# Patient Record
Sex: FEMALE | Race: WHITE | HISPANIC OR LATINO | Employment: FULL TIME | URBAN - METROPOLITAN AREA
[De-identification: names, ages, dates, MRNs, and addresses within clinical notes are randomized per-mention and may not be internally consistent; named-entity substitution may affect disease eponyms.]

---

## 2023-12-21 ENCOUNTER — HOSPITAL ENCOUNTER (EMERGENCY)
Facility: CLINIC | Age: 28
Discharge: HOME | End: 2023-12-21
Attending: EMERGENCY MEDICINE | Admitting: EMERGENCY MEDICINE

## 2023-12-21 VITALS
OXYGEN SATURATION: 99 % | HEART RATE: 67 BPM | WEIGHT: 125 LBS | DIASTOLIC BLOOD PRESSURE: 76 MMHG | TEMPERATURE: 98.3 F | SYSTOLIC BLOOD PRESSURE: 114 MMHG | RESPIRATION RATE: 16 BRPM

## 2023-12-21 DIAGNOSIS — L03.114 CELLULITIS OF LEFT UPPER EXTREMITY: ICD-10-CM

## 2023-12-21 PROCEDURE — 99284 EMERGENCY DEPT VISIT MOD MDM: CPT

## 2023-12-21 PROCEDURE — 250N000013 HC RX MED GY IP 250 OP 250 PS 637: Performed by: EMERGENCY MEDICINE

## 2023-12-21 RX ORDER — CEPHALEXIN 500 MG/1
500 CAPSULE ORAL ONCE
Status: COMPLETED | OUTPATIENT
Start: 2023-12-21 | End: 2023-12-21

## 2023-12-21 RX ORDER — CEPHALEXIN 500 MG/1
500 CAPSULE ORAL 4 TIMES DAILY
Qty: 28 CAPSULE | Refills: 0 | Status: SHIPPED | OUTPATIENT
Start: 2023-12-21 | End: 2023-12-28

## 2023-12-21 RX ORDER — DOXYCYCLINE HYCLATE 100 MG
100 TABLET ORAL 2 TIMES DAILY
Qty: 14 TABLET | Refills: 0 | Status: SHIPPED | OUTPATIENT
Start: 2023-12-21 | End: 2023-12-28

## 2023-12-21 RX ORDER — DOXYCYCLINE 100 MG/1
100 CAPSULE ORAL ONCE
Status: COMPLETED | OUTPATIENT
Start: 2023-12-21 | End: 2023-12-21

## 2023-12-21 RX ADMIN — DOXYCYCLINE HYCLATE 100 MG: 100 CAPSULE ORAL at 21:03

## 2023-12-21 RX ADMIN — CEPHALEXIN 500 MG: 500 CAPSULE ORAL at 21:03

## 2023-12-21 ASSESSMENT — ACTIVITIES OF DAILY LIVING (ADL): ADLS_ACUITY_SCORE: 33

## 2023-12-22 NOTE — ED PROVIDER NOTES
History   Chief Complaint:  Arm redness    HPI   History supplemented by electronic chart review    Thais Suzanne Espino is a 28 year old female who presents for evaluation of redness and some discomfort as well as slight swelling to her left forearm just distal to her left elbow, which she first noticed on Tuesday, 2 days ago.  No fevers.  She has been taking occasional Tylenol with partial relief.  No recent antibiotics.  No injury.  She is right-hand dominant and works as a , she is originally from [facility] but lives here in [place].  She had a similar issue on her buttock a while ago.  Her last menstrual period was on November 24, she denies any concern for pregnancy.  She is a 1-year-old child at home, no longer breast-feeding.    Medications:    Tylenol as needed    Past Medical History:    She specifically denies any history of diabetes or other chronic medical conditions    Physical Exam   Patient Vitals for the past 24 hrs:   BP Temp Pulse Resp SpO2 Weight   12/21/23 1949 114/76 -- -- -- -- --   12/21/23 1948 -- 98.3  F (36.8  C) 67 16 99 % 56.7 kg (125 lb)      Physical Exam  General: Nontoxic-appearing woman sitting upright in room 32,  at bedside  HENT: MMM, no signs of facial trauma  CV:  regular rhythm, soft compartments in LUE, cap refill normal in L fingers, normal L radial pulse  Resp: normal effort, speaks in full phrases, no stridor, no cough observed  MSK:  Extremities: Mild tenderness and swelling to the proximal aspect of the left posterior forearm, distal to the elbow.  Good range of motion of left elbow, no pain with axial loading of elbow or wrist.  Skin:   Poorly demarcated erythema spanning approximately 6 cm diameter on the posterior proximal left forearm, several punctate openings but no draining purulence, no fluctuance  Neuro: awake, alert, responds appropriately to commands, MACK RODRIGUEZ  Psych: cooperative    Emergency Department Course   Emergency Department  Course:  Reviewed:  I reviewed nursing notes, vitals, and past medical history    Assessments/Consultations/Discussion of Management or Tests :  I obtained history and examined the patient as noted above.      Interventions:  Medications   cephALEXin (KEFLEX) capsule 500 mg (500 mg Oral $Given 12/21/23 2103)   doxycycline hyclate (VIBRAMYCIN) capsule 100 mg (100 mg Oral $Given 12/21/23 2103)      Social Determinants of Health affecting care:   Healthcare Access/Compliance    Disposition:  Discharged    Impression & Plan    Medical Decision Making:  Her presentation is compatible with cellulitis of the left arm.  No findings of an abscess at this time, though given several openings in the wound that are not currently draining any purulence, as well as prior history of something similar on her buttock, I think it is most appropriate that she be treated with MRSA coverage so Keflex and doxycycline have been prescribed.  Nothing to culture.  I do not think that diagnostic testing would be of benefit at this time.  Vital signs are normal.  Also considered septic elbow, compartment syndrome, other orthopedic issues, and vascular compromise among other possibilities.  Return here for sudden worsening otherwise follow-up to clinic if not steadily improving after the weekend.    Diagnosis:    ICD-10-CM    1. Cellulitis of left upper extremity  L03.114            Discharge Prescriptions:  New Prescriptions    CEPHALEXIN (KEFLEX) 500 MG CAPSULE    Take 1 capsule (500 mg) by mouth 4 times daily for 7 days    DOXYCYCLINE HYCLATE (VIBRA-TABS) 100 MG TABLET    Take 1 tablet (100 mg) by mouth 2 times daily for 7 days       12/21/2023   MD Jd Vanegas Jeffrey Alan, MD  12/21/23 2108